# Patient Record
Sex: MALE | Race: WHITE | NOT HISPANIC OR LATINO | URBAN - METROPOLITAN AREA
[De-identification: names, ages, dates, MRNs, and addresses within clinical notes are randomized per-mention and may not be internally consistent; named-entity substitution may affect disease eponyms.]

---

## 2018-08-13 ENCOUNTER — EMERGENCY (EMERGENCY)
Facility: HOSPITAL | Age: 53
LOS: 1 days | Discharge: HOME | End: 2018-08-13
Attending: EMERGENCY MEDICINE | Admitting: EMERGENCY MEDICINE

## 2018-08-13 VITALS
HEART RATE: 74 BPM | DIASTOLIC BLOOD PRESSURE: 86 MMHG | WEIGHT: 210.1 LBS | TEMPERATURE: 97 F | OXYGEN SATURATION: 96 % | HEIGHT: 69 IN | RESPIRATION RATE: 20 BRPM | SYSTOLIC BLOOD PRESSURE: 144 MMHG

## 2018-08-13 DIAGNOSIS — R07.9 CHEST PAIN, UNSPECIFIED: ICD-10-CM

## 2018-08-13 LAB
BASOPHILS # BLD AUTO: 0.02 K/UL — SIGNIFICANT CHANGE UP (ref 0–0.2)
BASOPHILS NFR BLD AUTO: 0.2 % — SIGNIFICANT CHANGE UP (ref 0–1)
EOSINOPHIL # BLD AUTO: 0 K/UL — SIGNIFICANT CHANGE UP (ref 0–0.7)
EOSINOPHIL NFR BLD AUTO: 0 % — SIGNIFICANT CHANGE UP (ref 0–8)
HCT VFR BLD CALC: 42.2 % — SIGNIFICANT CHANGE UP (ref 42–52)
HGB BLD-MCNC: 14.2 G/DL — SIGNIFICANT CHANGE UP (ref 14–18)
IMM GRANULOCYTES NFR BLD AUTO: 0.2 % — SIGNIFICANT CHANGE UP (ref 0.1–0.3)
LYMPHOCYTES # BLD AUTO: 2.5 K/UL — SIGNIFICANT CHANGE UP (ref 1.2–3.4)
LYMPHOCYTES # BLD AUTO: 28.1 % — SIGNIFICANT CHANGE UP (ref 20.5–51.1)
MCHC RBC-ENTMCNC: 30.3 PG — SIGNIFICANT CHANGE UP (ref 27–31)
MCHC RBC-ENTMCNC: 33.6 G/DL — SIGNIFICANT CHANGE UP (ref 32–37)
MCV RBC AUTO: 90 FL — SIGNIFICANT CHANGE UP (ref 80–94)
MONOCYTES # BLD AUTO: 0.51 K/UL — SIGNIFICANT CHANGE UP (ref 0.1–0.6)
MONOCYTES NFR BLD AUTO: 5.7 % — SIGNIFICANT CHANGE UP (ref 1.7–9.3)
NEUTROPHILS # BLD AUTO: 5.85 K/UL — SIGNIFICANT CHANGE UP (ref 1.4–6.5)
NEUTROPHILS NFR BLD AUTO: 65.8 % — SIGNIFICANT CHANGE UP (ref 42.2–75.2)
NRBC # BLD: 0 /100 WBCS — SIGNIFICANT CHANGE UP (ref 0–0)
PLATELET # BLD AUTO: 289 K/UL — SIGNIFICANT CHANGE UP (ref 130–400)
RBC # BLD: 4.69 M/UL — LOW (ref 4.7–6.1)
RBC # FLD: 12.2 % — SIGNIFICANT CHANGE UP (ref 11.5–14.5)
WBC # BLD: 8.9 K/UL — SIGNIFICANT CHANGE UP (ref 4.8–10.8)
WBC # FLD AUTO: 8.9 K/UL — SIGNIFICANT CHANGE UP (ref 4.8–10.8)

## 2018-08-13 NOTE — ED PROVIDER NOTE - OBJECTIVE STATEMENT
53 yo M with PMHx of right eye corneal replacement presents to the ED c/o left sided chest pain. Pain started around 630PM while he was at [employer]. He drank some mcghee and started smoking a cigar, began to feel dizzy and then had chest discomfort. Pt states pain radiated to jaw. He denies hx fo similar symptoms in the past. Pt denies fever, chills, nausea, vomiting, abdominal pain, diarrhea, headache, weakness, SOB, back pain, LOC, trauma, urinary symptoms, cough, calf pain/swelling, recent travel, recent surgery.

## 2018-08-13 NOTE — ED PROVIDER NOTE - ATTENDING CONTRIBUTION TO CARE
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 States initially at 6-6:30 yesenia pm he was out at [facility], drank 2 sips of corona, and started smoking cigar. Felt sudden dizziness and then chest discomfort started. Pain was seconds in duration and he told me no radiation but told PA that pain went to jaw. +tingling to both hands and feet. Pt has no leg swelling. No travel history. No cough/rash/neck pain/HA. Pt exam as per PA note. Check labs, ekg, xray and likely OBS for r/o ACS.

## 2018-08-13 NOTE — ED PROVIDER NOTE - PHYSICAL EXAMINATION
VITAL SIGNS: I have reviewed nursing notes and confirm.  CONSTITUTIONAL: Well-developed; well-nourished; in no acute distress.  SKIN: Skin exam is warm and dry, no acute rash.  HEAD: Normocephalic; atraumatic.  EYES: Conjunctiva and sclera clear.  ENT: No nasal discharge; airway clear.   NECK: Supple; non tender.  CARD: S1, S2 normal; no murmurs, gallops, or rubs. Regular rate and rhythm.  RESP: No wheezes, rales or rhonchi. Speaking in full sentences.   ABD: Normal bowel sounds; soft; non-distended; non-tender; No rebound or guarding.   EXT: Normal ROM. No clubbing, cyanosis or edema.  NEURO: Alert, oriented. Grossly unremarkable. No focal deficits. CN II-XII intact. No dysmetria. No ataxia. Sensation intact throughout. Strength 5/5 throughout. Gait steady.

## 2018-08-13 NOTE — ED ADULT TRIAGE NOTE - CHIEF COMPLAINT QUOTE
" I have sharp pain in my chest and I feel dizzy." " I have sharp pain in my chest tonight and I feel dizzy since this morning."

## 2018-08-14 VITALS
RESPIRATION RATE: 18 BRPM | OXYGEN SATURATION: 97 % | HEART RATE: 72 BPM | TEMPERATURE: 97 F | DIASTOLIC BLOOD PRESSURE: 83 MMHG | SYSTOLIC BLOOD PRESSURE: 136 MMHG

## 2018-08-14 DIAGNOSIS — Z94.7 CORNEAL TRANSPLANT STATUS: Chronic | ICD-10-CM

## 2018-08-14 LAB
ALBUMIN SERPL ELPH-MCNC: 4.3 G/DL — SIGNIFICANT CHANGE UP (ref 3.5–5.2)
ALP SERPL-CCNC: 54 U/L — SIGNIFICANT CHANGE UP (ref 30–115)
ALT FLD-CCNC: 27 U/L — SIGNIFICANT CHANGE UP (ref 0–41)
ANION GAP SERPL CALC-SCNC: 13 MMOL/L — SIGNIFICANT CHANGE UP (ref 7–14)
AST SERPL-CCNC: 19 U/L — SIGNIFICANT CHANGE UP (ref 0–41)
BILIRUB DIRECT SERPL-MCNC: <0.2 MG/DL — SIGNIFICANT CHANGE UP (ref 0–0.2)
BILIRUB INDIRECT FLD-MCNC: SIGNIFICANT CHANGE UP MG/DL (ref 0.2–1.2)
BILIRUB SERPL-MCNC: <0.2 MG/DL — SIGNIFICANT CHANGE UP (ref 0.2–1.2)
BUN SERPL-MCNC: 12 MG/DL — SIGNIFICANT CHANGE UP (ref 10–20)
CALCIUM SERPL-MCNC: 9.5 MG/DL — SIGNIFICANT CHANGE UP (ref 8.5–10.1)
CHLORIDE SERPL-SCNC: 102 MMOL/L — SIGNIFICANT CHANGE UP (ref 98–110)
CO2 SERPL-SCNC: 27 MMOL/L — SIGNIFICANT CHANGE UP (ref 17–32)
CREAT SERPL-MCNC: 0.7 MG/DL — SIGNIFICANT CHANGE UP (ref 0.7–1.5)
GLUCOSE SERPL-MCNC: 77 MG/DL — SIGNIFICANT CHANGE UP (ref 70–99)
POTASSIUM SERPL-MCNC: 4.4 MMOL/L — SIGNIFICANT CHANGE UP (ref 3.5–5)
POTASSIUM SERPL-SCNC: 4.4 MMOL/L — SIGNIFICANT CHANGE UP (ref 3.5–5)
PROT SERPL-MCNC: 7.3 G/DL — SIGNIFICANT CHANGE UP (ref 6–8)
SODIUM SERPL-SCNC: 142 MMOL/L — SIGNIFICANT CHANGE UP (ref 135–146)
TROPONIN T SERPL-MCNC: <0.01 NG/ML — SIGNIFICANT CHANGE UP
TROPONIN T SERPL-MCNC: <0.01 NG/ML — SIGNIFICANT CHANGE UP

## 2018-08-14 RX ORDER — METOPROLOL TARTRATE 50 MG
100 TABLET ORAL ONCE
Qty: 0 | Refills: 0 | Status: COMPLETED | OUTPATIENT
Start: 2018-08-14 | End: 2018-08-14

## 2018-08-14 RX ADMIN — Medication 100 MILLIGRAM(S): at 09:01

## 2018-08-14 NOTE — ED ADULT NURSE REASSESSMENT NOTE - NS ED NURSE REASSESS COMMENT FT1
Pt assessed A&Ox3, no apparent distress noted, pt on cardiac monitor. Pt denies chest pain at this time. Safety and comfort measures maintained. Will continue to monitor.

## 2018-08-14 NOTE — ED CDU PROVIDER INITIAL DAY NOTE - ATTENDING CONTRIBUTION TO CARE
Seen with PA agree with above, lungs- clear, abdomen- soft no tenderness to any region, neuro non focal, s1s2 no gallops or murmurs, full workup pending will continue to re-evaluate

## 2018-08-14 NOTE — ED ADULT NURSE NOTE - NSIMPLEMENTINTERV_GEN_ALL_ED
Implemented All Universal Safety Interventions:  [place] to call system. Call bell, personal items and telephone within reach. Instruct patient to call for assistance. Room bathroom lighting operational. Non-slip footwear when patient is off stretcher. Physically safe environment: no spills, clutter or unnecessary equipment. Stretcher in lowest position, wheels locked, appropriate side rails in place.

## 2018-08-14 NOTE — ED CDU PROVIDER DISPOSITION NOTE - CLINICAL COURSE
Patient was sent to [facility] for further evaluation of chest pains, Has remained in no distress  and with stable vitals since arrival to [facility], Ekgs times two no evvidence of ischemic changes, enzymes times two normal, CCTA was read as normal coronaries, Copies of all blood work and other studies were given to patient and family and qwill fallow up with both PMD and Cardiology next week

## 2018-08-14 NOTE — ED CDU PROVIDER INITIAL DAY NOTE - OBJECTIVE STATEMENT
52 y.o. male with hx of corenal transplant presents to the ED for evlaution of chest pain x 1 day.  Pt states that he was a ta BBQ around 1800 hrs and developed sharp left sided chest pain with no radiation of the pain.  Pain is somewhat exacerbated with ROM of left arm with no alleviating factors.  Pt adds no other complaints at this time and denies dyspnea, extremity pain, edema of lower extremities.  Unknown if he had stress test in the past.  Currently does not follow with cardiology.  Smokes occasionally.  No fhx of cardiac dz. 52 y.o. male with hx of corneal transplant presents to the ED for evaluation of chest pain x 1 day.  Pt states that he was at a BBQ around 1800 hrs and developed sharp left sided chest pain with no radiation of the pain.  Pain is somewhat exacerbated with ROM of left arm with no alleviating factors.  Pt adds no other complaints at this time and denies dyspnea, extremity pain, edema of lower extremities.  Unknown if he had stress test in the past.  Currently does not follow with cardiology.  Smokes occasionally.  No fhx of cardiac dz.

## 2018-08-14 NOTE — ED CDU PROVIDER INITIAL DAY NOTE - PHYSICAL EXAMINATION
CONST: Well appearing in NAD  EYES: Sclera and conjunctiva clear.  ENT: No nasal discharge. Oropharynx normal appearing, no erythema or exudates. Uvula midline.  NECK: Non-tender, supple  CARD: Normal S1 S2; Normal rate and rhythm, no M/R/G, mild reproducible left chest wall tenderness  RESP: Equal BS B/L, No wheezes, rhonchi or rales. No distress  GI: Soft, non-tender, non-distended.  MS: Normal ROM in all extremities. No edema of lower extremities, no calf tenderness, pedal pulses 2+ bilaterally  SKIN: Warm, dry, no acute rashes. Good turgor  NEURO: A&Ox3, No focal deficits. Strength 5/5 with no sensory deficits. Steady gait

## 2018-08-14 NOTE — ED CDU PROVIDER INITIAL DAY NOTE - NS ED ROS FT
CONST: No fever, chills or body aches  EYES: No pain, redness, drainage or visual changes.  ENT: No ear pain or discharge, nasal discharge or congestion. No sore throat  CARD: + left sided chest pressure. No palpitations  RESP: No SOB, cough, hemoptysis  GI: No abdominal pain, N/V/D  : No urinary frequency, urgency or dysuria  MS: No joint pain, back pain or extremity pain/injury  SKIN: No rashes  NEURO: No headache, dizziness, paresthesias

## 2018-08-14 NOTE — ED CDU PROVIDER INITIAL DAY NOTE - PROGRESS NOTE DETAILS
Pt sleeping comfortably at this time.  negative 2nd set of enzymes.  will continue to monitor. patient signed out from rianna mims no complaint, will continue to monitor

## 2018-08-14 NOTE — ED CDU PROVIDER DISPOSITION NOTE - ATTENDING CONTRIBUTION TO CARE
Please see clinical course full cardiac workup including CCTA  negative will fallow up with both PMD and Cardiology next week

## 2018-09-18 ENCOUNTER — INPATIENT (INPATIENT)
Facility: HOSPITAL | Age: 53
LOS: 2 days | Discharge: HOME | End: 2018-09-21
Attending: INTERNAL MEDICINE | Admitting: INTERNAL MEDICINE
Payer: COMMERCIAL

## 2018-09-18 VITALS
SYSTOLIC BLOOD PRESSURE: 139 MMHG | HEART RATE: 86 BPM | RESPIRATION RATE: 20 BRPM | OXYGEN SATURATION: 98 % | TEMPERATURE: 97 F

## 2018-09-18 DIAGNOSIS — Z94.7 CORNEAL TRANSPLANT STATUS: Chronic | ICD-10-CM

## 2018-09-18 LAB
ALBUMIN SERPL ELPH-MCNC: 4.9 G/DL — SIGNIFICANT CHANGE UP (ref 3.5–5.2)
ALP SERPL-CCNC: 60 U/L — SIGNIFICANT CHANGE UP (ref 30–115)
ALT FLD-CCNC: 40 U/L — SIGNIFICANT CHANGE UP (ref 0–41)
ANION GAP SERPL CALC-SCNC: 14 MMOL/L — SIGNIFICANT CHANGE UP (ref 7–14)
AST SERPL-CCNC: 26 U/L — SIGNIFICANT CHANGE UP (ref 0–41)
BASE EXCESS BLDV CALC-SCNC: 4.8 MMOL/L — HIGH (ref -2–2)
BASOPHILS # BLD AUTO: 0.01 K/UL — SIGNIFICANT CHANGE UP (ref 0–0.2)
BASOPHILS NFR BLD AUTO: 0.2 % — SIGNIFICANT CHANGE UP (ref 0–1)
BILIRUB SERPL-MCNC: 0.5 MG/DL — SIGNIFICANT CHANGE UP (ref 0.2–1.2)
BUN SERPL-MCNC: 17 MG/DL — SIGNIFICANT CHANGE UP (ref 10–20)
CA-I SERPL-SCNC: 1.19 MMOL/L — SIGNIFICANT CHANGE UP (ref 1.12–1.3)
CALCIUM SERPL-MCNC: 9.5 MG/DL — SIGNIFICANT CHANGE UP (ref 8.5–10.1)
CHLORIDE SERPL-SCNC: 100 MMOL/L — SIGNIFICANT CHANGE UP (ref 98–110)
CHOLEST SERPL-MCNC: 194 MG/DL — SIGNIFICANT CHANGE UP (ref 100–200)
CO2 SERPL-SCNC: 27 MMOL/L — SIGNIFICANT CHANGE UP (ref 17–32)
CREAT SERPL-MCNC: 0.7 MG/DL — SIGNIFICANT CHANGE UP (ref 0.7–1.5)
EOSINOPHIL # BLD AUTO: 0 K/UL — SIGNIFICANT CHANGE UP (ref 0–0.7)
EOSINOPHIL NFR BLD AUTO: 0 % — SIGNIFICANT CHANGE UP (ref 0–8)
ESTIMATED AVERAGE GLUCOSE: 105 MG/DL — SIGNIFICANT CHANGE UP (ref 68–114)
GAS PNL BLDV: 139 MMOL/L — SIGNIFICANT CHANGE UP (ref 136–145)
GAS PNL BLDV: SIGNIFICANT CHANGE UP
GLUCOSE SERPL-MCNC: 104 MG/DL — HIGH (ref 70–99)
HBA1C BLD-MCNC: 5.3 % — SIGNIFICANT CHANGE UP (ref 4–5.6)
HCO3 BLDV-SCNC: 31 MMOL/L — HIGH (ref 22–29)
HCT VFR BLD CALC: 41.4 % — LOW (ref 42–52)
HCT VFR BLDA CALC: 45.6 % — HIGH (ref 34–44)
HDLC SERPL-MCNC: 31 MG/DL — LOW
HGB BLD CALC-MCNC: 14.9 G/DL — SIGNIFICANT CHANGE UP (ref 14–18)
HGB BLD-MCNC: 13.9 G/DL — LOW (ref 14–18)
IMM GRANULOCYTES NFR BLD AUTO: 0.3 % — SIGNIFICANT CHANGE UP (ref 0.1–0.3)
LACTATE BLDV-MCNC: 1.1 MMOL/L — SIGNIFICANT CHANGE UP (ref 0.5–1.6)
LIPID PNL WITH DIRECT LDL SERPL: 144 MG/DL — HIGH (ref 4–129)
LYMPHOCYTES # BLD AUTO: 2.3 K/UL — SIGNIFICANT CHANGE UP (ref 1.2–3.4)
LYMPHOCYTES # BLD AUTO: 34.8 % — SIGNIFICANT CHANGE UP (ref 20.5–51.1)
MCHC RBC-ENTMCNC: 30.5 PG — SIGNIFICANT CHANGE UP (ref 27–31)
MCHC RBC-ENTMCNC: 33.6 G/DL — SIGNIFICANT CHANGE UP (ref 32–37)
MCV RBC AUTO: 90.8 FL — SIGNIFICANT CHANGE UP (ref 80–94)
MONOCYTES # BLD AUTO: 0.52 K/UL — SIGNIFICANT CHANGE UP (ref 0.1–0.6)
MONOCYTES NFR BLD AUTO: 7.9 % — SIGNIFICANT CHANGE UP (ref 1.7–9.3)
NEUTROPHILS # BLD AUTO: 3.75 K/UL — SIGNIFICANT CHANGE UP (ref 1.4–6.5)
NEUTROPHILS NFR BLD AUTO: 56.8 % — SIGNIFICANT CHANGE UP (ref 42.2–75.2)
NRBC # BLD: 0 /100 WBCS — SIGNIFICANT CHANGE UP (ref 0–0)
PCO2 BLDV: 51 MMHG — SIGNIFICANT CHANGE UP (ref 41–51)
PH BLDV: 7.39 — SIGNIFICANT CHANGE UP (ref 7.26–7.43)
PLATELET # BLD AUTO: 249 K/UL — SIGNIFICANT CHANGE UP (ref 130–400)
PO2 BLDV: 21 MMHG — SIGNIFICANT CHANGE UP (ref 20–40)
POTASSIUM BLDV-SCNC: 4.2 MMOL/L — SIGNIFICANT CHANGE UP (ref 3.3–5.6)
POTASSIUM SERPL-MCNC: 4.4 MMOL/L — SIGNIFICANT CHANGE UP (ref 3.5–5)
POTASSIUM SERPL-SCNC: 4.4 MMOL/L — SIGNIFICANT CHANGE UP (ref 3.5–5)
PROT SERPL-MCNC: 7.8 G/DL — SIGNIFICANT CHANGE UP (ref 6–8)
RBC # BLD: 4.56 M/UL — LOW (ref 4.7–6.1)
RBC # FLD: 12.5 % — SIGNIFICANT CHANGE UP (ref 11.5–14.5)
SAO2 % BLDV: 33 % — SIGNIFICANT CHANGE UP
SODIUM SERPL-SCNC: 141 MMOL/L — SIGNIFICANT CHANGE UP (ref 135–146)
TOTAL CHOLESTEROL/HDL RATIO MEASUREMENT: 6.3 RATIO — HIGH (ref 4–5.5)
TRIGL SERPL-MCNC: 163 MG/DL — HIGH (ref 10–149)
TROPONIN T SERPL-MCNC: <0.01 NG/ML — SIGNIFICANT CHANGE UP
WBC # BLD: 6.6 K/UL — SIGNIFICANT CHANGE UP (ref 4.8–10.8)
WBC # FLD AUTO: 6.6 K/UL — SIGNIFICANT CHANGE UP (ref 4.8–10.8)

## 2018-09-18 RX ORDER — SODIUM CHLORIDE 9 MG/ML
1000 INJECTION INTRAMUSCULAR; INTRAVENOUS; SUBCUTANEOUS ONCE
Qty: 0 | Refills: 0 | Status: COMPLETED | OUTPATIENT
Start: 2018-09-18 | End: 2018-09-18

## 2018-09-18 RX ORDER — ASPIRIN/CALCIUM CARB/MAGNESIUM 324 MG
325 TABLET ORAL ONCE
Qty: 0 | Refills: 0 | Status: COMPLETED | OUTPATIENT
Start: 2018-09-18 | End: 2018-09-18

## 2018-09-18 RX ORDER — ASPIRIN/CALCIUM CARB/MAGNESIUM 324 MG
325 TABLET ORAL ONCE
Qty: 0 | Refills: 0 | Status: DISCONTINUED | OUTPATIENT
Start: 2018-09-18 | End: 2018-09-18

## 2018-09-18 RX ADMIN — Medication 325 MILLIGRAM(S): at 13:31

## 2018-09-18 RX ADMIN — SODIUM CHLORIDE 2000 MILLILITER(S): 9 INJECTION INTRAMUSCULAR; INTRAVENOUS; SUBCUTANEOUS at 13:15

## 2018-09-18 NOTE — ED ADULT NURSE NOTE - NSIMPLEMENTINTERV_GEN_ALL_ED
Implemented All Fall with Harm Risk Interventions:  [place] to call system. Call bell, personal items and telephone within reach. Instruct patient to call for assistance. Room bathroom lighting operational. Non-slip footwear when patient is off stretcher. Physically safe environment: no spills, clutter or unnecessary equipment. Stretcher in lowest position, wheels locked, appropriate side rails in place. Provide visual cue, wrist band, yellow gown, etc. Monitor gait and stability. Monitor for mental status changes and reorient to person, place, and time. Review medications for side effects contributing to fall risk. Reinforce activity limits and safety measures with patient and family. Provide visual clues: red socks.

## 2018-09-18 NOTE — ED PROVIDER NOTE - PHYSICAL EXAMINATION
CONSTITUTIONAL: Well-developed; well-nourished; in no acute distress.   SKIN: warm, dry  HEAD: Normocephalic; atraumatic.  EYES: PERRL, EOMI, no conjunctival erythema  ENT: No nasal discharge; airway clear, mucous membranes moist  NECK: Supple; non tender.  CARD: +S1, S2 no murmurs, gallops, or rubs. Regular rate and rhythm.   RESP: No wheezes, rales or rhonchi. CTABL  ABD: soft ntnd  EXT: moves all extremities, ambulates wo assistance No clubbing, cyanosis or edema.   NEURO: Alert, oriented, no focal deficits, slight facial asymmetry L sided facial droop, no ataxia, no dysmetria, nih 1  PSYCH: Cooperative, appropriate.

## 2018-09-18 NOTE — ED PROVIDER NOTE - ATTENDING CONTRIBUTION TO CARE
53 y.o. M, no PMH, comes in c/o episodes of facial droop and slurred speech which started happening overnight, but are now more frequent and last longer. No HA. No n/v. No blurry/double vision. No CP/SOB. No abdominal pain. On exam, pt in NAD, AAOx3, head NC/AT, (+) mild facial droop on the left, lungs CTA B/L, CV S1S2 regular, abdomen soft/NT/ND/(+)BS, ext (-) edema, motor 5/5x4, sensation intact. Pt seen by me on arrival. Code stroke called. Will do labs, CT, and admit.

## 2018-09-18 NOTE — ED PROVIDER NOTE - NS ED ROS FT
General: No fevers, chills, nausea, vomiting  Eyes:  No visual changes,   ENMT:  No hearing changes  Cardiac:  No chest pain, SOB or edema.  Respiratory:  No cough or respiratory distress.   GI:  No nausea, vomiting,   :  No dysuria,   MS:  No myalgia,  Neuro:  No headache or weakness.  No LOC.  Skin:  No skin rash.   Endocrine: No history of thyroid disease or diabetes.

## 2018-09-18 NOTE — ED CDU PROVIDER INITIAL DAY NOTE - OBJECTIVE STATEMENT
52 y/o male with PMHX of Hyperthyroidism, Positive Family Cardiac and CVA History presenting under TIA Protocol. As per daughter, for the past couple of days patient has been having numbness of the left side of his face. States he has had multiple episodes of the incident, each episode lasting laonger in duration. Daughter states the numbness continues 52 y/o male with PMHX of Hyperthyroidism, Positive Family Cardiac and CVA History presenting under TIA Protocol. As per daughter, for the past couple of days patient has been having numbness of the left side of his face/neck. States he has had multiple episodes of the incident, each episode lasting longer in duration. Patient states the numbness of the left side of the face/neck continues but not as severe. Daughter states when he has been having the episodes, his entire jaw shifts to the right side/"droops" and patient has difficulty speaking. 54 y/o male with PMHX of Hyperthyroidism, Positive Family Cardiac and CVA History presenting under TIA Protocol. As per daughter, for the past couple of days patient has been having numbness of the left side of his face/neck, lasting a few minutes. States he has had multiple episodes of the incident, each episode lasting longer in duration over the last couple of days. Patient admits to decreased sensation of the left  face/neck still present but not as severe as his episodes have been . Daughter states when he has been having the episodes, his entire jaw shifts to the right side/"droops" and patient has difficulty speaking.

## 2018-09-18 NOTE — ED CDU PROVIDER INITIAL DAY NOTE - FAMILY HISTORY
Mother  Still living? Unknown  Family history of cerebrovascular accident (CVA) in mother, Age at diagnosis: Age Unknown     Father  Still living? Unknown  Family history of acute myocardial infarction, Age at diagnosis: Age Unknown

## 2018-09-18 NOTE — ED PROVIDER NOTE - OBJECTIVE STATEMENT
54yo M no pmhx presents with waxing/waning L sided facial droop, numbness, slurred speech since waking up this morning. pt denies other symptoms or complaints at this time. family member at bedside. stroke code called, NIH 1

## 2018-09-18 NOTE — ED PROVIDER NOTE - MEDICAL DECISION MAKING DETAILS
53 y.o. M, no PMH, comes in c/o episodes of facial droop and slurred speech which started happening overnight, but are now more frequent and last longer. No HA. No n/v. No blurry/double vision. No CP/SOB. No abdominal pain. On exam, pt in NAD, AAOx3, head NC/AT, (+) mild facial droop on the left, lungs CTA B/L, CV S1S2 regular, abdomen soft/NT/ND/(+)BS, ext (-) edema, motor 5/5x4, sensation intact. Pt seen by me on arrival. Code stroke called. Labs and CT done. Pt evaluated by neuro. Symptoms resolved now. Will transfer to obs for TIA.

## 2018-09-18 NOTE — ED ADULT NURSE REASSESSMENT NOTE - NS ED NURSE REASSESS COMMENT FT1
Patient assessed - resting comfortably in bed with no complaints of pain or discomfort. Patient ambulates without assistance. Cardiac monitor on and alarms audible. Patient awaiting ech9o results and MRI scheduled for AM. Will continue to monitor for comfort and safety.

## 2018-09-18 NOTE — ED CDU PROVIDER INITIAL DAY NOTE - ATTENDING CONTRIBUTION TO CARE
Seen with PA agree with above, lungs- clear, abdomen- soft no tenderness to any region, neuro - non focal, s1s2 no gallops or murmurs, full TIA workup in progress will continue to re-evaluate

## 2018-09-18 NOTE — PROGRESS NOTE ADULT - SUBJECTIVE AND OBJECTIVE BOX
BLAINE RYAN    Chief Complaint: Transient facial asymmetry.    Right Handed    HPI:  53 year old gentleman with no significant past medical history, presents to the ED with episodic left facial asymmetry. Over the last few days he complains of symptoms of left facial asymmetry affecting his speech. He woke up asymptomatic at 0800 and at 1000 developed transient left facial asymmetry lasting a few minutes. He visited his PMD and they referred him to a neurologist. He left the doctors office and again experienced left facial asymmetry, upon arrival to ED exhibited left facial asymmetry. On initial neuro exam patient was asymptomatic. CTH and CTA head and neck failed to reveal any significant abnormality.     Relevant PMH:  [] Prior ischemic stroke/TIA  [] Afib  []CAD  []HTN  []DLD  []DM []PVD []Obesity [] Sedintary lifestyle []CHF  []CLEMENTINA []Cancer Hx     Social History: [] Smoking []  Drug Use: []   Alcohol Use:   [] Other:      Possible Location of Stroke:  Unknown etiology of symptoms, will have a better understanding post stroke workup.  Possible Cause of Stroke:  Unknown etiology of symptoms, will have a better understanding post stroke workup.  Relevant Cerebral Imaging:  CTH failed to reveal intracranial pathology.  Relevant Cervicocerebral Imaging:  CT Angio Neck w/ IV Cont:   EXAM:  CT ANGIO NECK (W)AW IC          IMPRESSION:     1.  Aberrant right subclavian coursing posterior to the esophagus and   trachea.    2.  Bovine arch.    3.  Otherwise unremarkable CTA of the neck.    IMPRESSION:     1.  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    2.  Trifurcation of the A2 segments of the anterior cerebral arteries.    3.  Small/hypoplastic A1 segment of left anterior cerebral artery.     4.  No proximal large vessel occlusion or stenosis.    Relevant blood tests:  A1C LDL pending    Relevant cardiac rhythm monitoring:  Recently placed on telemetry monitoring and no events reported.  Relevant Cardiac Structure:(TTE/MAYANK +/-):[]No intracardiac thrombus/[] no vegetation/[]no akynesia/EF:TTE Pending.      Home Medications:      MEDICATIONS  (STANDING):      PT/OT/Speech/Rehab/S&Swr: Pending    Exam:    Vital Signs Last 24 Hrs  T(C): 36.3 (18 Sep 2018 12:27), Max: 36.3 (18 Sep 2018 12:27)  T(F): 97.3 (18 Sep 2018 12:27), Max: 97.3 (18 Sep 2018 12:27)  HR: 86 (18 Sep 2018 12:27) (86 - 86)  BP: 139/- (18 Sep 2018 12:27) (139/- - 139/-)  BP(mean): --  RR: 20 (18 Sep 2018 12:27) (20 - 20)  SpO2: 98% (18 Sep 2018 12:27) (98% - 98%)    NIHSS      LOC:       1a:   0  1b(Questions):     0      1c(Instructions):  0           Best Gaze:0  Visual:0  Motor:                 RUE:    0 RLE:  0   LUE:  0   LLE:  0   FACE:0     Limb Ataxia:0  Sensory:     0  Language:     0  Dysarthria:        0  Extinction and Inattention:0    NIHSS on admission:     0     m-RS:0    Impression:  53 year old gentleman with no significant past medical history, presents to the ED with episodic left facial asymmetry. Over the last few days he complains of symptoms of left facial asymmetry affecting his speech. He woke up asymptomatic at 0800 and at 1000 developed transient left facial asymmetry lasting a few minutes. He visited his PMD and they referred him to a neurologist. He left the doctors office and again experienced left facial asymmetry, upon arrival to ED exhibited left facial asymmetry. On initial neuro exam patient was asymptomatic. CTH and CTA head and neck failed to reveal any significant abnormality.       Suggestion:  Routine TIA workup including:  MRI brain without ronnie.  TTE with contrast to rule out right to left shunt, vegetation, akinesia, intracardiac thrombus.  Telemetry monitoring.  LDL, Hemoglobin A1C.   PT OT Rehab     Normal saline 100 ml per hour   mg po daily.  Lipitor 80 mg Qhs.    Disposition:  TIA observational unit.    We spent more than 45 minutes to review the chart, gather necessary information, evaluate the patient and discuss the case with primary team and counseling the patient and his family to their satisfaction. Total visit time more than 60min.  Khoa Jansen MD.  [id number] BLAINE RYAN    Chief Complaint: Transient facial asymmetry.    Right Handed    HPI:  53 year old gentleman with no significant past medical history, presents to the ED with episodic left facial asymmetry. Over the last few days he complains of symptoms of left facial asymmetry affecting his speech. He woke up asymptomatic at 0800 and at 1000 developed transient left facial asymmetry lasting a few minutes. He visited his PMD and they referred him to a neurologist. He left the doctors office and again experienced left facial asymmetry, upon arrival to ED exhibited left facial asymmetry. On initial neuro exam patient was asymptomatic. CTH and CTA head and neck failed to reveal any significant abnormality.     Relevant PMH:  [] Prior ischemic stroke/TIA  [] Afib  []CAD  []HTN  []DLD  []DM []PVD []Obesity [] Sedintary lifestyle []CHF  []CLEMENTINA []Cancer Hx     Social History: [] Smoking []  Drug Use: []   Alcohol Use:   [] Other:      Possible Location of Stroke:  Unknown etiology of symptoms, will have a better understanding post stroke workup.  Possible Cause of Stroke:  Unknown etiology of symptoms, will have a better understanding post stroke workup.  Relevant Cerebral Imaging:  CTH failed to reveal intracranial pathology.  Relevant Cervicocerebral Imaging:  CT Angio Neck w/ IV Cont:   EXAM:  CT ANGIO NECK (W)AW IC          IMPRESSION:     1.  Aberrant right subclavian coursing posterior to the esophagus and   trachea.    2.  Bovine arch.    3.  Otherwise unremarkable CTA of the neck.    IMPRESSION:     1.  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    2.  Trifurcation of the A2 segments of the anterior cerebral arteries.    3.  Small/hypoplastic A1 segment of left anterior cerebral artery.     4.  No proximal large vessel occlusion or stenosis.    Relevant blood tests:  A1C LDL pending    Relevant cardiac rhythm monitoring:  Recently placed on telemetry monitoring and no events reported.  Relevant Cardiac Structure:(TTE/MAYANK +/-):[]No intracardiac thrombus/[] no vegetation/[]no akynesia/EF:TTE Pending.      Home Medications:      MEDICATIONS  (STANDING):      PT/OT/Speech/Rehab/S&Swr: Pending    Exam:    Vital Signs Last 24 Hrs  T(C): 36.3 (18 Sep 2018 12:27), Max: 36.3 (18 Sep 2018 12:27)  T(F): 97.3 (18 Sep 2018 12:27), Max: 97.3 (18 Sep 2018 12:27)  HR: 86 (18 Sep 2018 12:27) (86 - 86)  BP: 139/- (18 Sep 2018 12:27) (139/- - 139/-)  BP(mean): --  RR: 20 (18 Sep 2018 12:27) (20 - 20)  SpO2: 98% (18 Sep 2018 12:27) (98% - 98%)    NIHSS      LOC:       1a:   0  1b(Questions):     0      1c(Instructions):  0           Best Gaze:0  Visual:0  Motor:                 RUE:    0 RLE:  0   LUE:  0   LLE:  0   FACE:0     Limb Ataxia:0  Sensory:     0  Language:     0  Dysarthria:        0  Extinction and Inattention:0    NIHSS on admission:     0     m-RS:0    Impression:  53 year old gentleman with no significant past medical history, presents to the ED with episodic left facial asymmetry. Over the last few days he complains of symptoms of left facial asymmetry affecting his speech. He woke up asymptomatic at 0800 and at 1000 developed transient left facial asymmetry lasting a few minutes. He explains that his jaw is twisting to the right forcefully during these events. Upon arrival to ED exhibited left facial asymmetry. On initial neuro exam patient was asymptomatic. CTH and CTA head and neck failed to reveal any significant abnormality.       Suggestion:  Routine TIA workup including:  MRI brain without ronnie.  TTE with contrast to rule out right to left shunt, vegetation, akinesia, intracardiac thrombus.  Telemetry monitoring.  LDL, Hemoglobin A1C.   PT OT Rehab     Normal saline 100 ml per hour   mg po daily.  Statin Qhs.    Disposition:  TIA observational unit.    We spent more than 45 minutes to review the chart, gather necessary information, evaluate the patient and discuss the case with primary team and counseling the patient and his family to their satisfaction. Total visit time more than 60min.  Khoa Jansen MD.  [id number]

## 2018-09-18 NOTE — ED ADULT TRIAGE NOTE - CHIEF COMPLAINT QUOTE
facial droop and facial numbness intermittent since last night associated with slurred speech. Pt currently have symptoms in triage room, stroke code initiated

## 2018-09-18 NOTE — STROKE CODE NOTE - NIH STROKE SCALE: 4. FACIAL PALSY, QM
(1) Minor paralysis (flattened nasolabial fold, asymmetry on smiling)
(0) Normal symmetrical movements

## 2018-09-18 NOTE — ED ADULT NURSE REASSESSMENT NOTE - NS ED NURSE REASSESS COMMENT FT1
pt brought over from critical care area of er. ivl. cardiac monitor in place. pt had echo. awaiting results. safety and comfort maintained. will continue to monitor

## 2018-09-19 LAB — GLUCOSE BLDC GLUCOMTR-MCNC: 99 MG/DL — SIGNIFICANT CHANGE UP (ref 70–99)

## 2018-09-19 PROCEDURE — 93880 EXTRACRANIAL BILAT STUDY: CPT | Mod: 26

## 2018-09-19 RX ORDER — HEPARIN SODIUM 5000 [USP'U]/ML
5000 INJECTION INTRAVENOUS; SUBCUTANEOUS EVERY 8 HOURS
Qty: 0 | Refills: 0 | Status: DISCONTINUED | OUTPATIENT
Start: 2018-09-19 | End: 2018-09-21

## 2018-09-19 RX ORDER — ATORVASTATIN CALCIUM 80 MG/1
80 TABLET, FILM COATED ORAL AT BEDTIME
Qty: 0 | Refills: 0 | Status: DISCONTINUED | OUTPATIENT
Start: 2018-09-19 | End: 2018-09-21

## 2018-09-19 RX ORDER — ACETAMINOPHEN 500 MG
650 TABLET ORAL EVERY 8 HOURS
Qty: 0 | Refills: 0 | Status: DISCONTINUED | OUTPATIENT
Start: 2018-09-19 | End: 2018-09-21

## 2018-09-19 RX ORDER — ASPIRIN/CALCIUM CARB/MAGNESIUM 324 MG
81 TABLET ORAL DAILY
Qty: 0 | Refills: 0 | Status: DISCONTINUED | OUTPATIENT
Start: 2018-09-19 | End: 2018-09-21

## 2018-09-19 RX ADMIN — ATORVASTATIN CALCIUM 80 MILLIGRAM(S): 80 TABLET, FILM COATED ORAL at 21:43

## 2018-09-19 RX ADMIN — HEPARIN SODIUM 5000 UNIT(S): 5000 INJECTION INTRAVENOUS; SUBCUTANEOUS at 21:43

## 2018-09-19 NOTE — H&P ADULT - NSHPPHYSICALEXAM_GEN_ALL_CORE
PHYSICAL EXAM:  GEN: No acute distress  HEENT:   LUNGS: Clear to auscultation bilaterally   HEART: S1/S2 present. RRR.   ABD: Soft, non-tender, non-distended. Bowel sounds present  EXT:  NEURO: AAOX3 PHYSICAL EXAM:  GEN: No acute distress  HEENT:   LUNGS: Clear to auscultation bilaterally   HEART: S1/S2 present. RRR.   ABD: Soft, non-tender, non-distended. Bowel sounds present  EXT:  NEURO: A & O x 4. Non focal. Patient's face look mildly symmetry but spare forehead. + numbness on left side face.   Motor: 5/5 b/l ULE. Sensory: intact b/l ULE.

## 2018-09-19 NOTE — H&P ADULT - NSHPSOCIALHISTORY_GEN_ALL_CORE
Alcohol -   Smoke -   Illicit drug - Alcohol - socially.   Smoke - smoke cigar socially.   Illicit drug - Denied.

## 2018-09-19 NOTE — ED CDU PROVIDER SUBSEQUENT DAY NOTE - PROGRESS NOTE DETAILS
pt. is resting comfortably, in no distress, on cardiac monitor, pt. is here for tia workup, needs mri of brain Pt seen at bedside, NAD. No overnight events. Pt currently waiting for MRI/MRA. Will continue to monitor.

## 2018-09-19 NOTE — H&P ADULT - NSHPREVIEWOFSYSTEMS_GEN_ALL_CORE
REVIEW OF SYSTEMS:    CONSTITUTIONAL: No weakness, fevers or chills  EYES/ENT: No visual changes;  No vertigo or throat pain   NECK: No pain or stiffness  RESPIRATORY: No cough, wheezing, hemoptysis; No shortness of breath  CARDIOVASCULAR: No chest pain or palpitations  GASTROINTESTINAL: No abdominal or epigastric pain. No nausea, vomiting, or hematemesis; No diarrhea or constipation. No melena or hematochezia.  GENITOURINARY: No dysuria, frequency or hematuria  NEUROLOGICAL: No numbness or weakness  SKIN: No itching, rashes REVIEW OF SYSTEMS:    CONSTITUTIONAL: No weakness, fevers or chills  EYES/ENT: No visual changes;  No vertigo or throat pain   NECK: No pain or stiffness.  RESPIRATORY: No cough, wheezing, hemoptysis; No shortness of breath  CARDIOVASCULAR: No chest pain or palpitations  GASTROINTESTINAL: No abdominal or epigastric pain. No nausea, vomiting, or hematemesis; No diarrhea or constipation. No melena or hematochezia.  GENITOURINARY: No dysuria, frequency or hematuria  NEUROLOGICAL: + numbness and tingling of the left sided of face. No weakness.   SKIN: No itching, rashes.

## 2018-09-19 NOTE — H&P ADULT - NSHPLABSRESULTS_GEN_ALL_CORE
13.9   6.60  )-----------( 249      ( 18 Sep 2018 12:53 )             41.4             09-18    141  |  100  |  17  ----------------------------<  104<H>  4.4   |  27  |  0.7    Ca    9.5      18 Sep 2018 12:53    TPro  7.8  /  Alb  4.9  /  TBili  0.5  /  DBili  x   /  AST  26  /  ALT  40  /  AlkPhos  60  09-18    LIVER FUNCTIONS - ( 18 Sep 2018 12:53 )  Alb: 4.9 g/dL / Pro: 7.8 g/dL / ALK PHOS: 60 U/L / ALT: 40 U/L / AST: 26 U/L / GGT: x                   CARDIAC MARKERS ( 18 Sep 2018 12:53 )  x     / <0.01 ng/mL / x     / x     / x

## 2018-09-19 NOTE — H&P ADULT - ATTENDING COMMENTS
Patient is seen and examined independently at bedside. I agree with the resident's note, history and plan as above.  PHYSICAL EXAM:    CONSTITUTIONAL: NAD, well-groomed, well-developed.  HEAD:  Atraumatic, Normocephalic.  EYES: EOMI, PERRLA, conjunctiva and sclera clear, right eye exophthalmos with eye lid asymmetry noted with droop on right side. Chronic vision abnormality in right eye. scar and cornea thickening on right eye  ENMT: Moist mucous membranes, No lesions.  NERVOUS SYSTEM:  Alert & Oriented X3, Good concentration; No limb weakness or numbness. Left facial paresthesia. No asymmetry noted. CN II-XII tact.   RESPIRATORY: Clear to percussion bilaterally; No rales, rhonchi, wheezing, or rubs.  CARDIOVASCULAR: Regular rate and rhythm; No murmurs, rubs, or gallops.  GASTROINTESTINAL: Soft, Nontender, Nondistended; Bowel sounds present.  MUSCULOSKELETAL: No joint swelling or tenderness. No neck or back pain.  EXTREMITIES: No clubbing, cyanosis, or edema.  LYMPH: No lymphadenopathy noted.  SKIN: No rashes or lesions.    # Intermittent transient facial asymmetry and dysarthria   rule out TIA vs. CVA vs. Cervical and laryngeal dystonia  - tele-monitoring  - c/w aspirin and statin  - follow up lyme titers, syphilis screen, vitamin B12 lipid profile, Hemoglobin A1C  - follow up MRI brain NC and Carotid duplex   - follow up TTE with bubble study to rule out right to left shunt, vegetation, akinesia, intracardiac thrombus.    # h/o Hyperthyroidism  - not on medications currently outpatient since blood work were within normal limit as per patient, no prior radiation therapy or surgery  - f/u TSH    # Right eye cornea transplant  - c/w eye drop when available    All labs, radiologic studies, vitals, orders and medications list reviewed. Patient is seen and examined independently at bedside. I agree with the resident's note, history and plan as above.  PHYSICAL EXAM:    CONSTITUTIONAL: NAD, well-groomed, well-developed.  HEAD:  Atraumatic, Normocephalic.  EYES: EOMI, PERRLA, conjunctiva and sclera clear, right eye exophthalmos with eye lid asymmetry noted with droop on right side. Chronic vision abnormality in right eye. scar and cornea thickening on right eye  ENMT: Moist mucous membranes, No lesions.  NERVOUS SYSTEM:  Alert & Oriented X3, Good concentration; No limb weakness or numbness. Left facial paresthesia. No asymmetry noted. CN II-XII tact.   RESPIRATORY: Clear to percussion bilaterally; No rales, rhonchi, wheezing, or rubs.  CARDIOVASCULAR: Regular rate and rhythm; No murmurs, rubs, or gallops.  GASTROINTESTINAL: Soft, Nontender, Nondistended; Bowel sounds present.  MUSCULOSKELETAL: No joint swelling or tenderness. No neck or back pain.  EXTREMITIES: No clubbing, cyanosis, or edema.  LYMPH: No lymphadenopathy noted.  SKIN: No rashes or lesions.    # Intermittent transient facial asymmetry and dysarthria   rule out TIA vs. CVA vs. Cervical and laryngeal dystonia  - tele-monitoring  - c/w aspirin and statin  - follow up lyme titers, syphilis screen, vitamin B12 lipid profile, Hemoglobin A1C  - follow up MRI brain NC and Carotid duplex   - follow up TTE with bubble study to rule out right to left shunt, vegetation, akinesia, intracardiac thrombus.    # h/o Hyperthyroidism  - not on medications currently outpatient since blood work were within normal limit as per patient, no prior radiation therapy or surgery  - f/u TSH    # Right eye cornea transplant  - c/w eye drop when available (noted to be prednisolone 1% on prescription dispensed)    All labs, radiologic studies, vitals, orders and medications list reviewed.

## 2018-09-19 NOTE — H&P ADULT - HISTORY OF PRESENT ILLNESS
54 yo M with no significant PMH presents to the ED with episodic left facial asymmetry. Over the last few days he complains of symptoms of left facial asymmetry affecting his speech. He woke up asymptomatic at 0800 and at 1000 developed transient left facial asymmetry lasting a few minutes. He visited his PMD and they referred him to a neurologist. He left the doctors office and again experienced left facial asymmetry, upon arrival to ED exhibited left facial asymmetry. On initial neuro exam patient was asymptomatic. CTH and CTA head and neck failed to reveal any significant abnormality. 54 yo M with PMH of hyperthyroidism, hyperglycemia presents to the ED with multiple episodes of left facial asymmetry. Patient reports he started feeling funny on his left sided face 2 days, so he looked at the mirror and found to his face to be asymmetric. Over the last few days he complains of symptoms of left facial asymmetry affecting his speech. He woke up asymptomatic at 8:00am today and at 10:00 am developed transient left facial asymmetry lasting a few minutes. He visited his PMD and they referred him to a neurologist. He left the doctors office and again experienced left facial asymmetry, upon arrival to ED exhibited left facial asymmetry. Denied any fever, chills, fatigue, arthritis, skin rashes, dizziness, vision changes, hearing problem, focal weakness, balance problem, dysphagia, chest pain, abdominal pain, dysuria. Denied any tick bites or any recent travel hx.     In ED, patient was found to have NIHSS score of 1.   CTH and CTA head and neck failed to reveal any significant abnormality.

## 2018-09-20 LAB
ALBUMIN SERPL ELPH-MCNC: 4.5 G/DL — SIGNIFICANT CHANGE UP (ref 3.5–5.2)
ALP SERPL-CCNC: 60 U/L — SIGNIFICANT CHANGE UP (ref 30–115)
ALT FLD-CCNC: 32 U/L — SIGNIFICANT CHANGE UP (ref 0–41)
ANION GAP SERPL CALC-SCNC: 12 MMOL/L — SIGNIFICANT CHANGE UP (ref 7–14)
AST SERPL-CCNC: 19 U/L — SIGNIFICANT CHANGE UP (ref 0–41)
BASOPHILS # BLD AUTO: 0.03 K/UL — SIGNIFICANT CHANGE UP (ref 0–0.2)
BASOPHILS NFR BLD AUTO: 0.4 % — SIGNIFICANT CHANGE UP (ref 0–1)
BILIRUB SERPL-MCNC: 0.4 MG/DL — SIGNIFICANT CHANGE UP (ref 0.2–1.2)
BUN SERPL-MCNC: 15 MG/DL — SIGNIFICANT CHANGE UP (ref 10–20)
CALCIUM SERPL-MCNC: 9.1 MG/DL — SIGNIFICANT CHANGE UP (ref 8.5–10.1)
CHLORIDE SERPL-SCNC: 99 MMOL/L — SIGNIFICANT CHANGE UP (ref 98–110)
CO2 SERPL-SCNC: 27 MMOL/L — SIGNIFICANT CHANGE UP (ref 17–32)
CREAT SERPL-MCNC: 0.8 MG/DL — SIGNIFICANT CHANGE UP (ref 0.7–1.5)
EOSINOPHIL # BLD AUTO: 0 K/UL — SIGNIFICANT CHANGE UP (ref 0–0.7)
EOSINOPHIL NFR BLD AUTO: 0 % — SIGNIFICANT CHANGE UP (ref 0–8)
GLUCOSE BLDC GLUCOMTR-MCNC: 113 MG/DL — HIGH (ref 70–99)
GLUCOSE BLDC GLUCOMTR-MCNC: 97 MG/DL — SIGNIFICANT CHANGE UP (ref 70–99)
GLUCOSE SERPL-MCNC: 109 MG/DL — HIGH (ref 70–99)
HCT VFR BLD CALC: 41.4 % — LOW (ref 42–52)
HGB BLD-MCNC: 14 G/DL — SIGNIFICANT CHANGE UP (ref 14–18)
IMM GRANULOCYTES NFR BLD AUTO: 0.1 % — SIGNIFICANT CHANGE UP (ref 0.1–0.3)
LYMPHOCYTES # BLD AUTO: 2.31 K/UL — SIGNIFICANT CHANGE UP (ref 1.2–3.4)
LYMPHOCYTES # BLD AUTO: 33 % — SIGNIFICANT CHANGE UP (ref 20.5–51.1)
MAGNESIUM SERPL-MCNC: 2.3 MG/DL — SIGNIFICANT CHANGE UP (ref 1.8–2.4)
MCHC RBC-ENTMCNC: 30.4 PG — SIGNIFICANT CHANGE UP (ref 27–31)
MCHC RBC-ENTMCNC: 33.8 G/DL — SIGNIFICANT CHANGE UP (ref 32–37)
MCV RBC AUTO: 90 FL — SIGNIFICANT CHANGE UP (ref 80–94)
MONOCYTES # BLD AUTO: 0.56 K/UL — SIGNIFICANT CHANGE UP (ref 0.1–0.6)
MONOCYTES NFR BLD AUTO: 8 % — SIGNIFICANT CHANGE UP (ref 1.7–9.3)
NEUTROPHILS # BLD AUTO: 4.09 K/UL — SIGNIFICANT CHANGE UP (ref 1.4–6.5)
NEUTROPHILS NFR BLD AUTO: 58.5 % — SIGNIFICANT CHANGE UP (ref 42.2–75.2)
NRBC # BLD: 0 /100 WBCS — SIGNIFICANT CHANGE UP (ref 0–0)
PLATELET # BLD AUTO: 255 K/UL — SIGNIFICANT CHANGE UP (ref 130–400)
POTASSIUM SERPL-MCNC: 4.1 MMOL/L — SIGNIFICANT CHANGE UP (ref 3.5–5)
POTASSIUM SERPL-SCNC: 4.1 MMOL/L — SIGNIFICANT CHANGE UP (ref 3.5–5)
PROT SERPL-MCNC: 7.5 G/DL — SIGNIFICANT CHANGE UP (ref 6–8)
RBC # BLD: 4.6 M/UL — LOW (ref 4.7–6.1)
RBC # FLD: 12.2 % — SIGNIFICANT CHANGE UP (ref 11.5–14.5)
SODIUM SERPL-SCNC: 138 MMOL/L — SIGNIFICANT CHANGE UP (ref 135–146)
WBC # BLD: 7 K/UL — SIGNIFICANT CHANGE UP (ref 4.8–10.8)
WBC # FLD AUTO: 7 K/UL — SIGNIFICANT CHANGE UP (ref 4.8–10.8)

## 2018-09-20 RX ADMIN — HEPARIN SODIUM 5000 UNIT(S): 5000 INJECTION INTRAVENOUS; SUBCUTANEOUS at 05:29

## 2018-09-20 RX ADMIN — Medication 81 MILLIGRAM(S): at 11:54

## 2018-09-20 RX ADMIN — HEPARIN SODIUM 5000 UNIT(S): 5000 INJECTION INTRAVENOUS; SUBCUTANEOUS at 21:14

## 2018-09-20 RX ADMIN — ATORVASTATIN CALCIUM 80 MILLIGRAM(S): 80 TABLET, FILM COATED ORAL at 21:15

## 2018-09-20 NOTE — PROGRESS NOTE ADULT - ASSESSMENT
53 year old gentleman with no significant past medical history, presents to the ED with episodic left facial asymmetry. CTH and CTA head and neck failed to reveal any significant abnormality. MRI brain is unremarkable. Unclear if these episodes were TIAs.     Plan:  Work up complete from neurological side- contrast echo planned for ?pulmonary hypertension per patient   Can continue aspirin  will sign off  reconsult PRN

## 2018-09-20 NOTE — PROGRESS NOTE ADULT - SUBJECTIVE AND OBJECTIVE BOX
HPI:    53 year old gentleman with no significant past medical history, presents to the ED with episodic left facial asymmetry. Over the last few days he complains of symptoms of left facial asymmetry affecting his speech. He woke up asymptomatic at 0800 and at 1000 developed transient left facial asymmetry lasting a few minutes. He visited his PMD and they referred him to a neurologist. He left the doctors office and again experienced left facial asymmetry, upon arrival to ED exhibited left facial asymmetry. On initial neuro exam patient was asymptomatic. CTH and CTA head and neck failed to reveal any significant abnormality.       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 650 milliGRAM(s) Oral every 8 hours PRN    VITALS:   T(F): 97.2  HR: 75  BP: 132/80  RR: 18  SpO2: 97%          LABS:                        14.0   7.00  )-----------( 255      ( 20 Sep 2018 07:09 )             41.4     09-20    138  |  99  |  15  ----------------------------<  109<H>  4.1   |  27  |  0.8    Ca    9.1      20 Sep 2018 07:09  Mg     2.3     09-20    TPro  7.5  /  Alb  4.5  /  TBili  0.4  /  DBili  x   /  AST  19  /  ALT  32  /  AlkPhos  60  09-20                  RADIOLOGY:  < from: MR Head No Cont (09.20.18 @ 09:50) >  EXAM:  MR BRAIN            PROCEDURE DATE:  09/20/2018            INTERPRETATION:  Clinical History / Reason for exam: Left-sided facial   numbness/droop. Rule out TIA/CVA.    Technique: Sagittal and axial T1-weighted images, axial T2 weighted   images, axial FLAIR images, axial gradient echo images and axial   diffusion weighted images of the brain were obtained on the 1.5 Shaista   magnet without administration of intravenous contrast.    Comparison is made with previous noncontrast CT head dated 9/18/2018.      Findings:  The ventricles, basal cisterns and sulcal pattern are within   normal limits for patients stated age.  There are no cerebral, cerebellar   or midbrain parenchymal signal abnormalities.  There is no acute mass   effect, midline shift or hemorrhage.  No extra-axial fluid collections   are identified.      There are no acute infarcts on diffusion weighted images.  Expected   signal flow voids are noted in the major intracranial vessels consistent   with their patency.      Globes and orbits are grossly within normal limits. Minimal signal   abnormality is noted on axial T2-weighted images in the bilateral ethmoid   and left greater than right maxillary sinuses consistent with mucosal   thickening and/or inflammatory change. The remaining paranasal sinuses   and bilateral mastoid complexes are within normal limits.      There is no bone marrow signal abnormality.  The sella is unremarkable.        IMPRESSION:      1.  No acute infarcts, intracranial hemorrhage or parenchymal brain   signal abnormalities.     2.  Mild maxillary and ethmoid sinus disease.        < end of copied text >      PHYSICAL EXAM:  General:  Appearance is consistent with chronologic age.  No abnormal facies.  Gross skin survey within normal limits.    Cognitive/Language:  The patient is oriented to person, place, time and date.  Nondysarthric.    Eyes: intact VA, VFF.  EOMI w/o nystagmus, skew or reported double vision.  PERRL.  No ptosis/weakness of eyelid closure.    Face:  Facial sensation normal V1 - 3, no facial asymmetry.    Ears/Nose/Throat:  Hearing grossly intact b/l.  Palate elevates midline.  Tongue and uvula midline.   Motor examination:   Normal tone, bulk and range of motion.  No tenderness, twitching, tremors or involuntary movements.  Formal Muscle Strength Testing: (MRC grade R/L) 5/5 UE; 5/5 LE.  No observable drift.  Sensory examination:   Intact to light touch and pinprick, pain.  Cerebellum:   FTN/HKS intact with normal CYNTHIA in all limbs.  No dysmetria or dysdiadokinesia.

## 2018-09-20 NOTE — SWALLOW BEDSIDE ASSESSMENT ADULT - SLP PERTINENT HISTORY OF CURRENT PROBLEM
pt presented to the ED w/ multiple episodes of left facial asymmetry. pt found to have TIA. CTH unremarkable. No acute infarcts, intracranial hemorrhage or parenchymal brain signal abnormalities.

## 2018-09-20 NOTE — PROGRESS NOTE ADULT - ASSESSMENT
54 yo M with PMH of hyperthyroidism presents to the ED with multiple episodes of left facial asymmetry, believed to be TIA. CTH and CTA unremarkable.     #) Transient facial asymmetry 2/2 TIA  - currently resolved, asymptomatic  - CTH: unremarkable  - CTA: unremarkable.   - MRI head - unremarkable  - c/w ASA, Statin  - Neuro following - MRI, TTE w/ bubble, lipid profile, A1c  - discharge pending echo w/ bubble read (study performed)    #) Hypothyroidism - off Synthroid, TSH drawn, pending    DVT ppx: Heparin subQ  Diet: regular  Activity: ambulate as tolerated  Code status: FULL  Dispo: anticipate home 54 yo M with PMH of hyperthyroidism presents to the ED with multiple episodes of left facial asymmetry, believed to be TIA. CTH and CTA unremarkable.     #) Transient facial asymmetry 2/2 TIA  - stroke code called in ED, NIH score = 0  - currently all symptoms resolved, asymptomatic  - CTH: unremarkable  - CTA: unremarkable.   - MRI head - unremarkable  - c/w ASA, Statin  - Neuro following - MRI, TTE w/ bubble, lipid profile, A1c  - discharge pending echo w/ bubble read (study performed)    #) Hypothyroidism - off Synthroid, TSH drawn, pending    DVT ppx: Heparin subQ  Diet: regular  Activity: ambulate as tolerated  Code status: FULL  Dispo: anticipate home

## 2018-09-20 NOTE — SWALLOW BEDSIDE ASSESSMENT ADULT - SLP GENERAL OBSERVATIONS
pt received in bed awake alert w/o c/o pain. pt reports speech and facial weakness both resolved. speech is clear and fluent.

## 2018-09-20 NOTE — PROGRESS NOTE ADULT - SUBJECTIVE AND OBJECTIVE BOX
SUBJECTIVE:    Stable - no complaints. No more facial tingling, numbness, or weakness. Awaiting echo w/ bubble read.    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     ALLERGIES:  No Known Allergies    MEDICATIONS:  STANDING MEDICATIONS  aspirin  chewable 81 milliGRAM(s) Oral daily  atorvastatin 80 milliGRAM(s) Oral at bedtime  heparin  Injectable 5000 Unit(s) SubCutaneous every 8 hours    PRN MEDICATIONS  acetaminophen   Tablet .. 650 milliGRAM(s) Oral every 8 hours PRN    VITALS:   T(F): 97.2  HR: 75  BP: 132/80  RR: 18  SpO2: 97%    LABS:                        14.0   7.00  )-----------( 255      ( 20 Sep 2018 07:09 )             41.4     09-20    138  |  99  |  15  ----------------------------<  109<H>  4.1   |  27  |  0.8    Ca    9.1      20 Sep 2018 07:09  Mg     2.3     09-20    TPro  7.5  /  Alb  4.5  /  TBili  0.4  /  DBili  x   /  AST  19  /  ALT  32  /  AlkPhos  60  09-20    PHYSICAL EXAM:  GEN: NAD, comfortable  LUNGS: CTAB, no w/r/r  HEART: RRR, no m/r/g  ABD: soft, NT/ND, +BS  EXT: no edema, PP b/l  NEURO: AAOx3

## 2018-09-20 NOTE — CONSULT NOTE ADULT - SUBJECTIVE AND OBJECTIVE BOX
Patient is a 53y old  Male who presents with a chief complaint of TIA (20 Sep 2018 15:35)    HPI:  54 yo M with PMH of hyperthyroidism, hyperglycemia presents to the ED with multiple episodes of left facial asymmetry. Patient reports he started feeling funny on his left sided face 2 days, so he looked at the mirror and found to his face to be asymmetric. Over the last few days he complains of symptoms of left facial asymmetry affecting his speech. He woke up asymptomatic at 8:00am today and at 10:00 am developed transient left facial asymmetry lasting a few minutes. He visited his PMD and they referred him to a neurologist. He left the doctors office and again experienced left facial asymmetry, upon arrival to ED exhibited left facial asymmetry. Denied any fever, chills, fatigue, arthritis, skin rashes, dizziness, vision changes, hearing problem, focal weakness, balance problem, dysphagia, chest pain, abdominal pain, dysuria. Denied any tick bites or any recent travel hx.     In ED, patient was found to have NIHSS score of 1.   CTH and CTA head and neck failed to reveal any significant abnormality. 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 TIA    TODAY'S SUBJECTIVE & REVIEW OF SYMPTOMS:     Constitutional WNL   Cardio WNL   Resp WNL   GI WNL  Heme WNL  Endo WNL  Skin WNL  MSK WNL  Neuro WNL  Cognitive WNL  Psych WNL      MEDICATIONS  (STANDING):  aspirin  chewable 81 milliGRAM(s) Oral daily  atorvastatin 80 milliGRAM(s) Oral at bedtime  heparin  Injectable 5000 Unit(s) SubCutaneous every 8 hours    MEDICATIONS  (PRN):  acetaminophen   Tablet .. 650 milliGRAM(s) Oral every 8 hours PRN Mild Pain (1 - 3)      FAMILY HISTORY:  Family history of acute myocardial infarction (Father)  Family history of cerebrovascular accident (CVA) in mother (Mother)      Allergies    No Known Allergies    Intolerances        SOCIAL HISTORY:    [    ] Etoh  [    ] Smoking  [    ] Substance abuse     Home Environment:  [    ] Home Alone  [  x  ] Lives with Family  [    ] Home Health Aid    Dwelling:  [    ] Apartment  [    ] Private House  [    ] Adult Home  [    ] Skilled Nursing Facility      [    ] Short Term  [    ] Long Term  [    ] Stairs                           [    ] Elevator     FUNCTIONAL STATUS PTA: (Check all that apply)  Ambulation: [   x  ]Independent    [    ] Dependent     [    ] Non-Ambulatory  Assistive Device: [    ] SA Cane  [    ]  Q Cane  [    ] Walker  [    ]  Wheelchair  ADL : [  x  ] Independent  [    ]  Dependent       Vital Signs Last 24 Hrs  T(C): 36.2 (20 Sep 2018 07:36), Max: 36.3 (19 Sep 2018 23:09)  T(F): 97.2 (20 Sep 2018 07:36), Max: 97.4 (19 Sep 2018 23:09)  HR: 75 (20 Sep 2018 07:36) (71 - 77)  BP: 132/80 (20 Sep 2018 07:36) (112/75 - 132/80)  BP(mean): --  RR: 18 (20 Sep 2018 07:36) (18 - 18)  SpO2: 97% (20 Sep 2018 07:36) (97% - 98%)      PHYSICAL EXAM: Alert & Oriented X3  GENERAL: NAD, well-groomed, well-developed  HEAD:  Atraumatic, Normocephalic  EYES: EOMI, PERRLA, conjunctiva and sclera clear  NECK: Supple, No JVD, Normal thyroid  CHEST/LUNG: Clear bilaterally; No rales, rhonchi, wheezing, or rubs  HEART: Regular rate and rhythm; No murmurs, rubs, or gallops  ABDOMEN: Soft, Nontender, Nondistended; Bowel sounds present  EXTREMITIES:  2+ Peripheral Pulses, No clubbing, cyanosis, or edema    NERVOUS SYSTEM:  Cranial Nerves 2-12 intact [ x   ] Abnormal  [    ]  ROM: WFL all extremities [  x  ]  Abnormal [     ]  Motor Strength: WFL all extremities  [  x  ]  Abnormal [    ]  Sensation: intact to light touch [  x  ] Abnormal [    ]      FUNCTIONAL STATUS:  Bed Mobility: [ x  ]  Independent [    ]  Supervision [    ]  Needs Assistance [  ]  N/A  Transfers: [  x  ]  Independent [    ]  Supervision [    ]  Needs Assistance [    ]  N/A    Ambulation:  [   x ]  Independent [    ]  Supervision [    ]  Needs Assistance [    ]  N/A   ADL:  [  x  ]   Independent [    ] Requires Assistance [    ] N/A   ABLE TO TANDEM    LABS:                        14.0   7.00  )-----------( 255      ( 20 Sep 2018 07:09 )             41.4     09-20    138  |  99  |  15  ----------------------------<  109<H>  4.1   |  27  |  0.8    Ca    9.1      20 Sep 2018 07:09  Mg     2.3     09-20    TPro  7.5  /  Alb  4.5  /  TBili  0.4  /  DBili  x   /  AST  19  /  ALT  32  /  AlkPhos  60  09-20          RADIOLOGY & ADDITIONAL STUDIES:

## 2018-09-21 ENCOUNTER — TRANSCRIPTION ENCOUNTER (OUTPATIENT)
Age: 53
End: 2018-09-21

## 2018-09-21 VITALS — DIASTOLIC BLOOD PRESSURE: 79 MMHG | HEART RATE: 89 BPM | RESPIRATION RATE: 18 BRPM | SYSTOLIC BLOOD PRESSURE: 109 MMHG

## 2018-09-21 LAB
FOLATE SERPL-MCNC: 14.3 NG/ML — SIGNIFICANT CHANGE UP
TSH SERPL-MCNC: 1.52 UIU/ML — SIGNIFICANT CHANGE UP (ref 0.27–4.2)
VIT B12 SERPL-MCNC: 693 PG/ML — SIGNIFICANT CHANGE UP (ref 232–1245)

## 2018-09-21 RX ORDER — ATORVASTATIN CALCIUM 80 MG/1
1 TABLET, FILM COATED ORAL
Qty: 30 | Refills: 0 | OUTPATIENT
Start: 2018-09-21 | End: 2018-10-20

## 2018-09-21 RX ORDER — ASPIRIN/CALCIUM CARB/MAGNESIUM 324 MG
1 TABLET ORAL
Qty: 30 | Refills: 0 | OUTPATIENT
Start: 2018-09-21 | End: 2018-10-20

## 2018-09-21 RX ADMIN — Medication 81 MILLIGRAM(S): at 11:10

## 2018-09-21 RX ADMIN — HEPARIN SODIUM 5000 UNIT(S): 5000 INJECTION INTRAVENOUS; SUBCUTANEOUS at 06:33

## 2018-09-21 NOTE — DISCHARGE NOTE ADULT - PLAN OF CARE
Medical management, prevention of future episodes, risk factor modification Please follow up with your PMD in 1-2 weeks and neurology in 1-2 weeks and if you notice any weakness, dizziness, vision problems please call [phone]

## 2018-09-21 NOTE — DISCHARGE NOTE ADULT - PROVIDER TOKENS
FREE:[LAST:[Anant],FIRST:[Diamante],PHONE:[(   )    -],FAX:[(   )    -],ADDRESS:[[facility]]] TOKEN:'[id number]',TOKEN:'[id number]'

## 2018-09-21 NOTE — DISCHARGE NOTE ADULT - MEDICATION SUMMARY - MEDICATIONS TO TAKE
I will START or STAY ON the medications listed below when I get home from the hospital:    aspirin 81 mg oral tablet  -- 1 tab(s) by mouth once a day   -- Take with food or milk.    -- Indication: For TIA (TRANSIENT ISCHEMIC ATTACK)    Lipitor 80 mg oral tablet  -- 1 tab(s) by mouth once a day (at bedtime)  -- Indication: For TIA (TRANSIENT ISCHEMIC ATTACK)

## 2018-09-21 NOTE — DISCHARGE NOTE ADULT - CARE PROVIDER_API CALL
Diamante [facility]  Phone: (   )    -  Fax: (   )    - Chan, Yeoman K (MD), Family Medicine  [address]  [address]  [place] [address]  Phone: [phone]  Fax: [phone]    Robby Ordonez), Neurology  [address] [place] [address]  [address]  [place] [address]  Phone: [phone]  Fax: [phone]

## 2018-09-21 NOTE — PROGRESS NOTE ADULT - SUBJECTIVE AND OBJECTIVE BOX
Discharge note      BLAINE RYAN  53y Male    INTERVAL HPI/OVERNIGHT EVENTS:    Pt feels well. No complaints. Numbness of left face has resolved. No left facial droop noted.   He is ambulating and comfortable in going home today.    T(F): 98 (18 @ 13:44), Max: 98.6 (18 @ 19:57)  HR: 91 (18 @ 13:44) (75 - 95)  BP: 119/81 (18 @ 13:44) (119/81 - 158/77)  RR: 18 (18 @ 13:44) (17 - 18)  SpO2: --    I&O's Summary    21 Sep 2018 07:01  -  21 Sep 2018 15:01  --------------------------------------------------------  IN: 450 mL / OUT: 0 mL / NET: 450 mL        Daily Height in cm: 180.34 (20 Sep 2018 16:29)    Daily Weight in k.5 (21 Sep 2018 05:02)      PHYSICAL EXAM:  GENERAL: NAD  HEAD:  Normocephalic  EYES:  conjunctiva and sclera clear  ENMT: Moist mucous membranes  NECK: Supple, No JVD  NERVOUS SYSTEM:  Alert & Oriented X3, Good concentration  No facial droop, follows commands, motor 5/5  CHEST/LUNG: Clear to percussion bilaterally; No rales, rhonchi, wheezing  HEART: Regular rate and rhythm; No murmurs  ABDOMEN: Soft, Nontender, Nondistended; Bowel sounds present  EXTREMITIES:   No edema  SKIN: No rashes     Consultant(s) Notes Reviewed:  [x ] YES  [ ] NO  Care Discussed with Consultants/Other Providers [ x] YES  [ ] NO    MEDICATIONS  (STANDING):  aspirin  chewable 81 milliGRAM(s) Oral daily  atorvastatin 80 milliGRAM(s) Oral at bedtime  heparin  Injectable 5000 Unit(s) SubCutaneous every 8 hours    MEDICATIONS  (PRN):  acetaminophen   Tablet .. 650 milliGRAM(s) Oral every 8 hours PRN Mild Pain (1 - 3)    EKG reviewed  Telemetry reviewed    LABS:                        14.0   7.00  )-----------( 255      ( 20 Sep 2018 07:09 )             41.4     09-20    138  |  99  |  15  ----------------------------<  109<H>  4.1   |  27  |  0.8    Ca    9.1      20 Sep 2018 07:09  Mg     2.3     -20    TPro  7.5  /  Alb  4.5  /  TBili  0.4  /  DBili  x   /  AST  19  /  ALT  32  /  AlkPhos  60  -20      Hemoglobin A1C with Mean Plasma Glucose (18 @ 14:53)    Hemoglobin A1C, Whole Blood: 5.3: Method: Immunoassay       Reference Range                4.0-5.6%       High risk (prediabetic)        5.7-6.4%       Diabetic, diagnostic             >=6.5%       ADA diabetic treatment goal       <7.0%  The Hemoglobin A1c testing is NGSP-certified.Reference ranges are based  upon the 2010 recommendations of  the American Diabetes Association.  Interpretation may vary for children  and adolescents. %    Mean Plasma Glucose: 105 mg/dL      Lipid Profile (18 @ 14:53)    Total Cholesterol/HDL Ratio Measurement: 6.3 Ratio    Cholesterol, Serum: 194 mg/dL    Triglycerides, Serum: 163 mg/dL    HDL Cholesterol, Serum: 31: HDL Levels >/= 60 mg/dL are considered beneficial and a "negative" risk  factor.  Effective 08/15/2018: New reference range and interpretive comment. mg/dL    Direct LDL: 144: LDL Cholesterol --- Interpretive Comment (for adults 18 and over)  Optimal LDL Level may vary based on clinical situation  Below 70                  Ideal for people at very high risk of heart  disease  Below 100                Ideal for people at risk of heart disease  100 - 129                   [place]  130 - 159                   Borderline high  160 - 189                   High  190 and Above          Very high mg/dL    RADIOLOGY & ADDITIONAL TESTS:    Imaging or report Personally Reviewed:  [x ] YES  [ ] NO    < from: MR Head No Cont (18 @ 09:50) >  IMPRESSION:      1.  No acute infarcts, intracranial hemorrhage or parenchymal brain   signal abnormalities.     2.  Mild maxillary and ethmoid sinus disease.    < end of copied text >    < from: Transthoracic Echocardiogram (18 @ 10:51) >  Summary:   1. Normal left ventricular size and wall thicknesses, with normal   systolic function.   2. Color flow doppler and intravenous injection of agitated saline   demonstrates the presence of an intact intra atrial septum.    < end of copied text >    < from: Transthoracic Echocardiogram (18 @ 15:58) >  Summary:   1. LV Ejection Fraction by Mendoza's Method with a biplane EF of 58 %.   2. Normal left ventricular size and wall thicknesses, with normal   systolic function.   3. The mean global longitudinal strain by speckle tracking is -11.8%   which is reduced.   4. Mild tricuspid regurgitation.   5. Estimated pulmonary artery systolic pressure is 38.1 mmHg assuming a   right atrial pressure of 15 mmHg, which is consistent with borderline   pulmonary hypertension.   6. LA volume Index is 15.2 ml/m² ml/m2.    < end of copied text >    < from: CT Angio Neck w/ IV Cont (18 @ 13:28) >  IMPRESSION:     1.  Aberrant right subclavian coursing posterior to the esophagus and   trachea.    2.  Bovine arch.    3.  Otherwise unremarkable CTA of the neck.    < end of copied text >    < from: CT Angio Head w/ IV Cont (18 @ 13:28) >  IMPRESSION:     1.  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    2.  Trifurcation of the A2 segments of the anterior cerebral arteries.    3.  Small/hypoplastic A1 segment of left anterior cerebral artery.     4.  No proximal large vessel occlusion or stenosis.    5.  If the patient continues to be symptomatic follow-up MRI of the brain   may be helpful for further evaluation.    < end of copied text >    < from: [facility] Duplex Reed Giles (18 @ 18:51) >  Impression:    20-39% stenosis of the right internal carotid artery.  20-39% stenosis of the left internal carotid artery.    < end of copied text >      Case discussed with resident    Care discussed with pt

## 2018-09-21 NOTE — DISCHARGE NOTE ADULT - CARE PLAN
Principal Discharge DX:	TIA (transient ischemic attack)  Goal:	Medical management, prevention of future episodes, risk factor modification  Assessment and plan of treatment:	Please follow up with your PMD in 1-2 weeks and neurology in 1-2 weeks and if you notice any weakness, dizziness, vision problems please call [phone]

## 2018-09-21 NOTE — DISCHARGE NOTE ADULT - HOSPITAL COURSE
52 yo M with PMH of hyperthyroidism, hyperglycemia presents to the ED with multiple episodes of left facial asymmetry NIHSS score was zero at the time of admission admitted for further work up, CTA, MRI head, vascular duplex was done which showed 20-39% stenosis, echo showed normal systolic function, , hbA1c is 5.3 started on aspirin and statin neuro cleared him for d/c. 54 yo M with PMH of hyperthyroidism, hyperglycemia presents to the ED with multiple episodes of left facial asymmetry.  NIHSS score was zero at the time of admission  and he was admitted for further work up including CTA which was negative for significant stenosis, MRI head negative for acute infarct, carotid doppler showed 20-39% stenosis, and echo showed normal systolic function and no shunt.  , hbA1c is 5.3. He was started on aspirin and statin and he is back to baseline.  He is medically stable for discharge and outpt f/u per neurology.

## 2018-09-21 NOTE — PROGRESS NOTE ADULT - ASSESSMENT
52 yo man with PMH of hyperthyroidism and hyperglycemia presented to the ED with multiple episodes of left facial asymmetry. He was diagnosed with TIA after workup. Neurology f/u appreciated.  He will continue ASA and high intensity statin for TIA and hyperlipidemia/dyslipidemia.  Smoking cessation.   Low saturated fat, carbohydrate consistent diet  He will f/u with PMD Dr. Yeoman Chan.    Discharge meds and instructions reviewed with residents.

## 2018-09-22 ENCOUNTER — EMERGENCY (EMERGENCY)
Facility: HOSPITAL | Age: 53
LOS: 0 days | Discharge: HOME | End: 2018-09-22
Attending: EMERGENCY MEDICINE | Admitting: EMERGENCY MEDICINE

## 2018-09-22 VITALS
RESPIRATION RATE: 20 BRPM | OXYGEN SATURATION: 100 % | HEART RATE: 88 BPM | SYSTOLIC BLOOD PRESSURE: 140 MMHG | DIASTOLIC BLOOD PRESSURE: 80 MMHG | TEMPERATURE: 98 F

## 2018-09-22 DIAGNOSIS — Z86.73 PERSONAL HISTORY OF TRANSIENT ISCHEMIC ATTACK (TIA), AND CEREBRAL INFARCTION WITHOUT RESIDUAL DEFICITS: ICD-10-CM

## 2018-09-22 DIAGNOSIS — Z79.82 LONG TERM (CURRENT) USE OF ASPIRIN: ICD-10-CM

## 2018-09-22 DIAGNOSIS — Z79.899 OTHER LONG TERM (CURRENT) DRUG THERAPY: ICD-10-CM

## 2018-09-22 DIAGNOSIS — R68.2 DRY MOUTH, UNSPECIFIED: ICD-10-CM

## 2018-09-22 DIAGNOSIS — Z94.7 CORNEAL TRANSPLANT STATUS: Chronic | ICD-10-CM

## 2018-09-22 DIAGNOSIS — R13.10 DYSPHAGIA, UNSPECIFIED: ICD-10-CM

## 2018-09-22 NOTE — ED ADULT NURSE NOTE - OBJECTIVE STATEMENT
pt is a 54 yo M w/ pmHx of TIA who presents to the ED w/ dry mouth. pt was d/c'd from hospital yesterday s/p TIA, pt stated he was put on lipitor and had now developed dry mouth. pt denies any other symptoms. -c pain, - dizziness, - sob. pt has unremarkable neuro exam. pt is aaox4, NAD is noted.

## 2018-09-22 NOTE — ED PROVIDER NOTE - OBJECTIVE STATEMENT
52yo m pmhx tia, d/c yesterday dx hld, presents CC 1 episode of dry mouth which woke him up from sleep around one hour pta, around 1845h. pt states he drank some water at that time but did not get relief. however, tolerating liquid and solid po at this time. pt was worried this is adverse effect of the statin he was recently started on so he came to the ed. managing secretions, per family member speech normal. 52yo m pmhx tia, d/c yesterday dx hld, presents CC 1 episode of dry mouth which woke him up from sleep around one hour pta, around 1845h. pt states he drank some water at that time but did not get relief. however, tolerating liquid and solid po at this time. pt was worried this is adverse effect of the statin he was recently started on so he came to the ed. managing secretions, per family member speech normal. no facial asymmetry.

## 2018-09-22 NOTE — ED PROVIDER NOTE - MEDICAL DECISION MAKING DETAILS
pt asymptomatic. pt vs wnl. pt tolerating both solids and liquids with no difficulty. cta b/l. cleared for d/c.

## 2018-09-22 NOTE — ED PROVIDER NOTE - PHYSICAL EXAMINATION
PE  throat normal, cn intact, strength and sensation grossly normal, mmm,   pt tolerating po liquid and solids here in ed, pased bedside speech and swallow CONSTITUTIONAL: Well-developed; well-nourished; in no acute distress.   SKIN: warm, dry  HEAD: Normocephalic; atraumatic.  EYES: PERRL, EOMI, no conjunctival erythema  ENT: No nasal discharge; airway clear, mucous membranes moist, no erythema, edema or exudates of oropharynx   NECK: Supple; non tender.  CARD: +S1, S2 no murmurs, gallops, or rubs. Regular rate and rhythm.   RESP: No wheezes, rales or rhonchi. CTABL  ABD: soft ntnd  EXT: moves all extremities, ambulates wo assistance No clubbing, cyanosis or edema.   NEURO: Alert, oriented, grossly unremarkable, no focal deficits, speech clear, managing secretions, tongue midline, cn ii-xii grossly intact, strength and sensation grossly intact  PSYCH: Cooperative, appropriate.    Pt passed bedside swallow evaluation, tolerating liquid and solids po in ED without issue.

## 2018-09-22 NOTE — ED PROVIDER NOTE - ATTENDING CONTRIBUTION TO CARE
I personally evaluated the patient. I reviewed the Resident’s or Physician Assistant’s note (as assigned above), and agree with the findings and plan except as documented in my note.  pt presents with resolved episode of difficulty swallowing. pt was recently d/c for tia work up. pt reported having difficulty swallowing a pill. pt denies sob, cp, ab pain, focal neuro deficits, voice change or neck pain. pt vs wnl. pt pe wnl. pt tolerating po intake. pt asymptomatic. supportive care and cleared for d/c. pt instructed to return to ed if symptoms return or worsen.

## 2018-09-22 NOTE — ED ADULT NURSE NOTE - NSIMPLEMENTINTERV_GEN_ALL_ED
Implemented All Universal Safety Interventions:  [place] to call system. Call bell, personal items and telephone within reach. Instruct patient to call for assistance. Room bathroom lighting operational. Non-slip footwear when patient is off stretcher. Physically safe environment: no spills, clutter or unnecessary equipment. Stretcher in lowest position, wheels locked, appropriate side rails in place.

## 2018-09-22 NOTE — ED PROVIDER NOTE - NS ED ROS FT
ros  no cp, sob, dysphagia, trouble swallowing, no n/v/dirrhea  general: no fevers, chiills, headache, nausea, vomiting  eyes: no eye pain or chagnes in vision  no changes in sensation abd pain dysuria hematuria       PE  throat normal, cn intact, strength and sensation grossly normal, mmm,   pt tolerating po liquid and solids here in ed, pased bedside speech adn swallow. General: No fevers, chills, nausea, vomiting  Eyes:  No visual changes, eye pain or discharge.  ENMT:  No hearing changes, pain, no sore throat, no difficulty swallowing  Cardiac:  No chest pain, SOB or edema.  Respiratory:  No cough or respiratory distress.   GI:  No nausea, vomiting, diarrhea or abdominal pain.  :  No dysuria,   MS:  No back pain.  Neuro:  No  weakness, numbne, tingling.  No LOC.  Skin:  No skin rash.   Endocrine: No history of thyroid disease or diabetes.

## 2018-09-23 PROBLEM — H16.061: Chronic | Status: ACTIVE | Noted: 2018-09-19

## 2018-09-23 PROBLEM — E05.90 THYROTOXICOSIS, UNSPECIFIED WITHOUT THYROTOXIC CRISIS OR STORM: Chronic | Status: ACTIVE | Noted: 2018-09-18

## 2018-09-25 DIAGNOSIS — E05.90 THYROTOXICOSIS, UNSPECIFIED WITHOUT THYROTOXIC CRISIS OR STORM: ICD-10-CM

## 2018-09-25 DIAGNOSIS — E78.5 HYPERLIPIDEMIA, UNSPECIFIED: ICD-10-CM

## 2018-09-25 DIAGNOSIS — G45.9 TRANSIENT CEREBRAL ISCHEMIC ATTACK, UNSPECIFIED: ICD-10-CM

## 2018-09-25 DIAGNOSIS — Z94.7 CORNEAL TRANSPLANT STATUS: ICD-10-CM

## 2018-09-25 DIAGNOSIS — F17.290 NICOTINE DEPENDENCE, OTHER TOBACCO PRODUCT, UNCOMPLICATED: ICD-10-CM

## 2018-09-25 DIAGNOSIS — R47.1 DYSARTHRIA AND ANARTHRIA: ICD-10-CM

## 2018-09-25 DIAGNOSIS — R29.810 FACIAL WEAKNESS: ICD-10-CM

## 2018-12-14 NOTE — DISCHARGE NOTE ADULT - NS AS DC AFIB YN RESTRICT
[phone] (home) [phone] (work)      Akash Aquino MD MPH         no,

## 2019-02-13 NOTE — ED PROVIDER NOTE - NSCAREINITIATED _GEN_ER
Cesario Earl(Attending) Unna Boot Text: An Unna boot was placed to help immobilize the limb and facilitate more rapid healing.

## 2019-05-08 PROBLEM — Z00.00 ENCOUNTER FOR PREVENTIVE HEALTH EXAMINATION: Status: ACTIVE | Noted: 2019-05-08

## 2019-08-29 ENCOUNTER — APPOINTMENT (OUTPATIENT)
Dept: NEUROLOGY | Facility: CLINIC | Age: 54
End: 2019-08-29
Payer: COMMERCIAL

## 2019-08-29 VITALS
DIASTOLIC BLOOD PRESSURE: 81 MMHG | BODY MASS INDEX: 30.8 KG/M2 | WEIGHT: 220 LBS | SYSTOLIC BLOOD PRESSURE: 120 MMHG | HEIGHT: 71 IN | HEART RATE: 79 BPM

## 2019-08-29 DIAGNOSIS — Z86.39 PERSONAL HISTORY OF OTHER ENDOCRINE, NUTRITIONAL AND METABOLIC DISEASE: ICD-10-CM

## 2019-08-29 DIAGNOSIS — G45.9 TRANSIENT CEREBRAL ISCHEMIC ATTACK, UNSPECIFIED: ICD-10-CM

## 2019-08-29 DIAGNOSIS — R20.2 PARESTHESIA OF SKIN: ICD-10-CM

## 2019-08-29 DIAGNOSIS — Z78.9 OTHER SPECIFIED HEALTH STATUS: ICD-10-CM

## 2019-08-29 PROCEDURE — 99215 OFFICE O/P EST HI 40 MIN: CPT

## 2019-08-29 RX ORDER — ROSUVASTATIN CALCIUM 40 MG/1
40 TABLET, FILM COATED ORAL DAILY
Refills: 0 | Status: ACTIVE | COMMUNITY

## 2019-08-29 NOTE — DISCUSSION/SUMMARY
[FreeTextEntry1] : Mr. Rivera is a 54yo man with episode for TIA last year with only risk factor being elevated LDL.  He is still having same symptoms.  Will evaluate for other etiologies\par 1. ESR CRP, ACHrAb, SSA/SSB, LUZMARIA, DsDNA\par 2. MRI cervical spine and MRI brain and IACw/w/o SHANTEL\par 3. Continue asa 81mg QD and atorvastatin\par 4. F/u in 6 months or sooner for abnoraml blood work or MRI's

## 2019-08-29 NOTE — PHYSICAL EXAM
[FreeTextEntry1] : Orientation: oriented to person, oriented to place and oriented to time. \par Attention: normal concentrating ability and visual attention was not decreased. \par Language: no difficulty naming common objects, no difficulty repeating a phrase, no difficulty writing a sentence, fluency intact, comprehension intact and reading intact. \par Fund of knowledge: displays adequate knowledge of personal past history. \par Cranial Nerves: visual fields full to confrontation, pupils equal round and reactive to light, extraocular motion intact, facial sensation intact symmetrically, face shows slight decrease in left lower lip, hearing was intact bilaterally, tongue and palate midline, head turning and shoulder shrug symmetric and there was no tongue deviation with protrusion. Right partial ptosis since corneal surgery\par Motor: muscle tone was normal in all four extremities, muscle strength was normal in all four extremities and normal bulk in all four extremities. \par Sensory exam: light touch, PP, Vibration was intact. \par Coordination:. normal gait. balance was intact. there was no past-pointing. no tremor present. \par Deep tendon reflexes: \par absent throughout\par Plantar responses normal on the right, normal on the left.  \par

## 2019-08-29 NOTE — HISTORY OF PRESENT ILLNESS
[FreeTextEntry1] : 53 year old gentleman with no significant past medical history, who presented to the ED on 9/2018 with episodic left facial asymmetry and numness. Over the last few days he complains of symptoms of left facial asymmetry affecting his speech. He had MRI brain done which was unremarkable and CTA H+N which was unremarkable.  LDL was 144 and hgba1c was 5.3.  He was diagnosed with TIA.  Since then he continues to get these episodes of left facial numbness and sometimes tightness.  the numbness can happen up to 3 x per day.  He also notes that he has had left arm and leg changes and sometimes difficulty walking since this episode.  He works lifting heavy objects and ha LBP and sometimes neck pain.\par He is on asa 81mg QD and atorvastatin 40mg QD

## 2019-09-05 ENCOUNTER — OTHER (OUTPATIENT)
Age: 54
End: 2019-09-05

## 2019-10-28 ENCOUNTER — OTHER (OUTPATIENT)
Age: 54
End: 2019-10-28

## 2021-10-01 NOTE — H&P ADULT - ASSESSMENT
Health Maintenance Due   Topic Date Due   • Depression Screening  Never done   • Shingles Vaccine (1 of 2) Never done   • Colorectal Cancer Screen-  Never done   • Hepatitis C Screening  Never done     Patient is due for the topics as listed above and wishes to proceed with them.  Appt scheduled to perform Immunization(s) Influenza and Shingles at the pharmacy as they are free with his insurance. .      53 year old gentleman with no significant past medical history, presents to the ED with episodic left facial asymmetry. Over the last few days he complains of symptoms of left facial asymmetry affecting his speech. He woke up asymptomatic at 0800 and at 1000 developed transient left facial asymmetry lasting a few minutes. He explains that his jaw is twisting to the right forcefully during these events. Upon arrival to ED exhibited left facial asymmetry. On initial neuro exam patient was asymptomatic. CTH and CTA head and neck failed to reveal any significant abnormality.       Transient facial asymmetry 2/2 TIA  - CTH: unremarkable. CTA: unremarkable.   - c/w ASA, Statin  - monitor FS q8h   - f/u MRI brain without ronnie  - f/u TTE with contrast to rule out right to left shunt, vegetation, akinesia, intracardiac thrombus.  - f/u lipid profile, Hemoglobin A1C  - c/w IVF NS at 100cc/hr as per Neuro.   - PT/Rehab  - admit for TIA work up  - f/u Neurology      Diet: regular diet.   Activity: ambulate as tolerated.   DVT ppx: heparin SC  GI ppx: not indicated. 53 year old gentleman with no significant past medical history, presents to the ED with episodic left facial asymmetry. Over the last few days he complains of symptoms of left facial asymmetry affecting his speech. He woke up asymptomatic at 0800 and at 1000 developed transient left facial asymmetry lasting a few minutes. He explains that his jaw is twisting to the right forcefully during these events. Upon arrival to ED exhibited left facial asymmetry. On initial neuro exam patient was asymptomatic. CTH and CTA head and neck failed to reveal any significant abnormality.       Transient facial asymmetry 2/2 TIA  - CTH: unremarkable. CTA: unremarkable.   - c/w ASA, Statin  - monitor FS q8h   - f/u MRI brain without ronnie  - f/u TTE with bubble study to rule out right to left shunt, vegetation, akinesia, intracardiac thrombus.  - f/u lipid profile, Hemoglobin A1C  - admit to Tele for TIA work up  - f/u Neurology      Diet: regular diet.   Activity: ambulate as tolerated.   DVT ppx: heparin SC  GI ppx: not indicated. 52 yo M with PMH of hyperthyroidism, hyperglycemia presents to the ED with multiple episodes of left facial asymmetry, found to have TIA. CTH and CTA unremarkable.     Transient facial asymmetry 2/2 TIA  - CTH: unremarkable. CTA: unremarkable.   - c/w ASA, Statin  - monitor FS  - f/u lyme abs, syphilis  - f/u Carotid duplex   - f/u MRI brain without ronnie  - f/u TTE with bubble study to rule out right to left shunt, vegetation, akinesia, intracardiac thrombus.  - f/u lipid profile, Hemoglobin A1C  - admit to Tele for TIA work up  - will get speech therapy.   - f/u Neurology    Hx of hyperglycemia  - monitor FS AC HS for now (insulin if FS persistently > 180)    Hx of hyperthyroidism  - f/u TSH  - patient is currently off medications.     Right eye cornea transplant  - patient's right eye lid is a little droop which is at his baseline.  - c/w eye drop      Diet: regular diet.   Activity: ambulate as tolerated.   DVT ppx: heparin SC  GI ppx: not indicated. 54 yo M with PMH of hyperthyroidism, hyperglycemia presents to the ED with multiple episodes of left facial asymmetry, found to have TIA. CTH and CTA unremarkable.     Transient facial asymmetry 2/2 TIA  - CTH: unremarkable. CTA: unremarkable.   - c/w ASA, Statin  - monitor FS  - f/u lyme abs, syphilis  - f/u Carotid duplex   - f/u MRI brain without ronnie  - f/u TTE with bubble study to rule out right to left shunt, vegetation, akinesia, intracardiac thrombus.  - f/u lipid profile, Hemoglobin A1C  - admit to Tele for TIA work up  - will get speech therapy.   - f/u Neurology    Hx of hyperglycemia  - monitor FS AC HS for now (insulin if FS persistently > 180)  - f/u HbA1c    Hx of hyperthyroidism  - f/u TSH  - patient is currently off medications.     Right eye cornea transplant  - patient's right eye lid is a little droop which is at his baseline.  - patient reports that he will ask daughter to bring in the eye drop from home today as he does not remember the name of the eye drop.   - c/w eye drop      Diet: carb consistent diet.   Activity: ambulate as tolerated.   DVT ppx: heparin SC  GI ppx: not indicated.   Code: Full code.

## 2022-04-14 NOTE — ED ADULT TRIAGE NOTE - ACCOMPANIED BY
EMT/paramedic Methotrexate Counseling:  Patient counseled regarding adverse effects of methotrexate including but not limited to nausea, vomiting, abnormalities in liver function tests. Patients may develop mouth sores, rash, diarrhea, and abnormalities in blood counts. The patient understands that monitoring is required including LFT's and blood counts.  There is a rare possibility of scarring of the liver and lung problems that can occur when taking methotrexate. Persistent nausea, loss of appetite, pale stools, dark urine, cough, and shortness of breath should be reported immediately. Patient advised to discontinue methotrexate treatment at least three months before attempting to become pregnant.  I discussed the need for folate supplements while taking methotrexate.  These supplements can decrease side effects during methotrexate treatment. The patient verbalized understanding of the proper use and possible adverse effects of methotrexate.  All of the patient's questions and concerns were addressed.

## 2022-08-26 NOTE — ED ADULT NURSE NOTE - CHIEF COMPLAINT
Behzad Otto  Mom only wants to talk to a provider  Bradley Fotser had an appendectomy yesterday and Mom wants to discuss this with a provider  Mom's phone #  [phone]   Thank you The patient is a 53y Male complaining of difficulty swallowing.

## 2024-04-24 NOTE — PATIENT PROFILE ADULT. - COMPLETE THE FOLLOWING IF THE PATIENT REFUSES THE INFLUENZA VACCINE:
Detail Level: Detailed
Add 30039 Cpt? (Important Note: In 2017 The Use Of 56383 Is Being Tracked By Cms To Determine Future Global Period Reimbursement For Global Periods): yes
Risks/benefits discussed with patient/surrogate

## 2025-04-16 NOTE — ED ADULT TRIAGE NOTE - MEANS OF ARRIVAL
Quality 111:Pneumonia Vaccination Status For Older Adults: Pneumococcal Vaccination Previously Received Quality 130: Documentation Of Current Medications In The Medical Record: Current Medications Documented Detail Level: Detailed Quality 431: Preventive Care And Screening: Unhealthy Alcohol Use - Screening: Patient screened for unhealthy alcohol use using a single question and scores less than 2 times per year ambulatory Quality 226: Preventive Care And Screening: Tobacco Use: Screening And Cessation Intervention: Patient screened for tobacco use and is an ex/non-smoker Therapeutic Intensity